# Patient Record
Sex: MALE | Race: BLACK OR AFRICAN AMERICAN | NOT HISPANIC OR LATINO | Employment: OTHER | URBAN - METROPOLITAN AREA
[De-identification: names, ages, dates, MRNs, and addresses within clinical notes are randomized per-mention and may not be internally consistent; named-entity substitution may affect disease eponyms.]

---

## 2017-10-02 PROBLEM — R50.9 FEBRILE ILLNESS: Status: ACTIVE | Noted: 2017-10-02

## 2017-10-02 PROBLEM — E16.2 HYPOGLYCEMIA: Status: ACTIVE | Noted: 2017-10-02

## 2017-10-02 PROBLEM — E86.1 INTRAVASCULAR VOLUME DEPLETION: Status: ACTIVE | Noted: 2017-10-02

## 2017-10-02 PROBLEM — E87.0 HYPERNATREMIA: Status: ACTIVE | Noted: 2017-10-02

## 2017-12-28 PROBLEM — E16.2 HYPOGLYCEMIA: Status: RESOLVED | Noted: 2017-10-02 | Resolved: 2017-12-28

## 2017-12-28 PROBLEM — E86.1 INTRAVASCULAR VOLUME DEPLETION: Status: RESOLVED | Noted: 2017-10-02 | Resolved: 2017-12-28

## 2018-08-23 PROBLEM — R50.9 FEBRILE ILLNESS: Status: RESOLVED | Noted: 2017-10-02 | Resolved: 2018-08-23

## 2019-03-09 PROBLEM — I63.9 ACUTE CVA (CEREBROVASCULAR ACCIDENT): Status: ACTIVE | Noted: 2019-03-09

## 2019-03-09 PROBLEM — S09.90XA HEAD TRAUMA: Status: ACTIVE | Noted: 2019-03-09

## 2019-03-09 PROBLEM — I63.9 STROKE: Status: ACTIVE | Noted: 2019-03-09

## 2019-03-10 PROBLEM — I63.511 ACUTE ISCHEMIC RIGHT MCA STROKE: Status: ACTIVE | Noted: 2019-03-09

## 2019-03-11 PROBLEM — I63.9 ACUTE CVA (CEREBROVASCULAR ACCIDENT): Status: ACTIVE | Noted: 2019-03-11

## 2019-03-19 PROBLEM — S09.90XA HEAD TRAUMA: Status: RESOLVED | Noted: 2019-03-09 | Resolved: 2019-03-19

## 2019-03-30 PROBLEM — I61.9 HEMORRHAGIC CEREBROVASCULAR ACCIDENT (CVA): Status: ACTIVE | Noted: 2019-03-30

## 2019-03-31 PROBLEM — G93.6 CYTOTOXIC CEREBRAL EDEMA: Status: ACTIVE | Noted: 2019-03-31

## 2019-03-31 PROBLEM — R41.4 LEFT-SIDED NEGLECT: Status: ACTIVE | Noted: 2019-03-31

## 2019-03-31 PROBLEM — Z86.73 HISTORY OF ISCHEMIC RIGHT MCA STROKE: Status: ACTIVE | Noted: 2019-03-30

## 2019-04-02 ENCOUNTER — TELEPHONE (OUTPATIENT)
Dept: NEUROLOGY | Facility: CLINIC | Age: 69
End: 2019-04-02

## 2019-04-02 NOTE — TELEPHONE ENCOUNTER
----- Message from Kamini Lam sent at 4/2/2019  3:03 PM CDT -----  Contact: [facility]  Patient need to schedule a 2 week hospital f/u from 04/02    Epic would not allow scheduling      Please call patient at [phone] (home)